# Patient Record
Sex: MALE | URBAN - METROPOLITAN AREA
[De-identification: names, ages, dates, MRNs, and addresses within clinical notes are randomized per-mention and may not be internally consistent; named-entity substitution may affect disease eponyms.]

---

## 2018-07-23 ENCOUNTER — OFFICE VISIT (OUTPATIENT)
Dept: URBAN - METROPOLITAN AREA CLINIC 81 | Facility: CLINIC | Age: 53
End: 2018-07-23

## 2018-07-23 DIAGNOSIS — H52.4 PRESBYOPIA: Primary | ICD-10-CM

## 2018-07-23 PROCEDURE — 92014 COMPRE OPH EXAM EST PT 1/>: CPT | Performed by: OPTOMETRIST

## 2018-07-23 ASSESSMENT — INTRAOCULAR PRESSURE
OD: 14
OS: 16

## 2018-07-23 ASSESSMENT — KERATOMETRY
OS: 41.88
OD: 42.25

## 2018-07-23 ASSESSMENT — VISUAL ACUITY
OS: 20/20
OD: 20/20

## 2018-07-23 NOTE — IMPRESSION/PLAN
Impression: Presbyopia: H52.4. Plan: Glasses Rx update given. Recommend UV protection. Discussed adaptation, pt understands. Pt also wants to update CL rx OS only monovision.

## 2021-12-22 ENCOUNTER — OFFICE VISIT (OUTPATIENT)
Dept: URBAN - METROPOLITAN AREA CLINIC 81 | Facility: CLINIC | Age: 56
End: 2021-12-22

## 2021-12-22 PROCEDURE — 92310 CONTACT LENS FITTING OU: CPT | Performed by: OPTOMETRIST

## 2021-12-22 PROCEDURE — 92002 INTRM OPH EXAM NEW PATIENT: CPT | Performed by: OPTOMETRIST

## 2021-12-22 ASSESSMENT — KERATOMETRY
OS: 42.00
OD: 42.25

## 2021-12-22 ASSESSMENT — VISUAL ACUITY
OD: 20/20
OS: 20/20

## 2021-12-22 NOTE — IMPRESSION/PLAN
Impression: Presbyopia: H52.4. Plan: Dispensed Rx for glasses and contacts to patient. RTC in 1 year. Order trials, ok to order after trials.